# Patient Record
Sex: FEMALE | Race: WHITE | NOT HISPANIC OR LATINO | Employment: FULL TIME | URBAN - METROPOLITAN AREA
[De-identification: names, ages, dates, MRNs, and addresses within clinical notes are randomized per-mention and may not be internally consistent; named-entity substitution may affect disease eponyms.]

---

## 2022-08-24 ENCOUNTER — HOSPITAL ENCOUNTER (EMERGENCY)
Facility: CLINIC | Age: 22
Discharge: HOME | End: 2022-08-24
Attending: PHYSICIAN ASSISTANT | Admitting: PHYSICIAN ASSISTANT

## 2022-08-24 ENCOUNTER — APPOINTMENT (OUTPATIENT)
Dept: ULTRASOUND IMAGING | Facility: CLINIC | Age: 22
End: 2022-08-24
Attending: PHYSICIAN ASSISTANT

## 2022-08-24 VITALS
OXYGEN SATURATION: 98 % | DIASTOLIC BLOOD PRESSURE: 75 MMHG | SYSTOLIC BLOOD PRESSURE: 111 MMHG | TEMPERATURE: 97.3 F | HEART RATE: 78 BPM | RESPIRATION RATE: 18 BRPM

## 2022-08-24 DIAGNOSIS — R10.2 PELVIC PAIN IN FEMALE: ICD-10-CM

## 2022-08-24 LAB
ALBUMIN UR-MCNC: 30 MG/DL
AMORPH CRY #/AREA URNS HPF: ABNORMAL /HPF
ANION GAP SERPL CALCULATED.3IONS-SCNC: 9 MMOL/L (ref 7–15)
APPEARANCE UR: ABNORMAL
BACTERIA #/AREA URNS HPF: ABNORMAL /HPF
BILIRUB UR QL STRIP: NEGATIVE
BUN SERPL-MCNC: 10 MG/DL (ref 6–20)
CALCIUM SERPL-MCNC: 9.3 MG/DL (ref 8.6–10)
CHLORIDE SERPL-SCNC: 104 MMOL/L (ref 98–107)
COLOR UR AUTO: YELLOW
CREAT SERPL-MCNC: 0.57 MG/DL (ref 0.51–0.95)
DEPRECATED HCO3 PLAS-SCNC: 26 MMOL/L (ref 22–29)
ERYTHROCYTE [DISTWIDTH] IN BLOOD BY AUTOMATED COUNT: 11.8 % (ref 10–15)
GFR SERPL CREATININE-BSD FRML MDRD: >90 ML/MIN/1.73M2
GLUCOSE SERPL-MCNC: 90 MG/DL (ref 70–99)
GLUCOSE UR STRIP-MCNC: NEGATIVE MG/DL
HCG UR QL: NEGATIVE
HCT VFR BLD AUTO: 40.4 % (ref 35–47)
HGB BLD-MCNC: 13.1 G/DL (ref 11.7–15.7)
HGB UR QL STRIP: ABNORMAL
HOLD SPECIMEN: NORMAL
KETONES UR STRIP-MCNC: 100 MG/DL
LEUKOCYTE ESTERASE UR QL STRIP: NEGATIVE
MCH RBC QN AUTO: 31.1 PG (ref 26.5–33)
MCHC RBC AUTO-ENTMCNC: 32.4 G/DL (ref 31.5–36.5)
MCV RBC AUTO: 96 FL (ref 78–100)
MUCOUS THREADS #/AREA URNS LPF: PRESENT /LPF
NITRATE UR QL: NEGATIVE
PH UR STRIP: 7.5 [PH] (ref 5–7)
PLATELET # BLD AUTO: 261 10E3/UL (ref 150–450)
POTASSIUM SERPL-SCNC: 3.7 MMOL/L (ref 3.4–5.3)
RBC # BLD AUTO: 4.21 10E6/UL (ref 3.8–5.2)
RBC URINE: >182 /HPF
SODIUM SERPL-SCNC: 139 MMOL/L (ref 136–145)
SP GR UR STRIP: 1.03 (ref 1–1.03)
SQUAMOUS EPITHELIAL: 1 /HPF
UROBILINOGEN UR STRIP-MCNC: NORMAL MG/DL
WBC # BLD AUTO: 11.4 10E3/UL (ref 4–11)
WBC URINE: 19 /HPF

## 2022-08-24 PROCEDURE — 80048 BASIC METABOLIC PNL TOTAL CA: CPT | Performed by: PHYSICIAN ASSISTANT

## 2022-08-24 PROCEDURE — 81003 URINALYSIS AUTO W/O SCOPE: CPT | Performed by: PHYSICIAN ASSISTANT

## 2022-08-24 PROCEDURE — 85027 COMPLETE CBC AUTOMATED: CPT | Performed by: PHYSICIAN ASSISTANT

## 2022-08-24 PROCEDURE — 36415 COLL VENOUS BLD VENIPUNCTURE: CPT | Performed by: PHYSICIAN ASSISTANT

## 2022-08-24 PROCEDURE — 76830 TRANSVAGINAL US NON-OB: CPT

## 2022-08-24 PROCEDURE — 99284 EMERGENCY DEPT VISIT MOD MDM: CPT | Mod: 25

## 2022-08-24 PROCEDURE — 81025 URINE PREGNANCY TEST: CPT | Performed by: PHYSICIAN ASSISTANT

## 2022-08-24 ASSESSMENT — ACTIVITIES OF DAILY LIVING (ADL): ADLS_ACUITY_SCORE: 35

## 2022-08-24 ASSESSMENT — ENCOUNTER SYMPTOMS
DYSURIA: 0
NAUSEA: 1
ABDOMINAL PAIN: 1

## 2022-08-24 NOTE — DISCHARGE INSTRUCTIONS
What do you do next:   Continue your home medications unless we have specifically changed them  Follow up as indicated below (since there is no primary care clinic listed, I will have the [facility] primary care service contact you about establishing follow-up with a clinic)    When do you return: If you have uncontrollable pain, intractable vomiting, profuse vaginal bleeding or you soak a pad edge to edge twice within an hour, or any other symptoms that concern you, please return to the ED for reevaluation.    Thank you for allowing us to care for you today.

## 2022-08-24 NOTE — ED PROVIDER NOTES
History   Chief Complaint:  Abdominal Pain     The history is provided by the patient and a friend (who translated in Ukranian).      Shabnam Clemens is a 22 year old female who presents with lower abdominal pain. The patient reports having a sudden onset of abdominal cramping and severe pain approximately 2 hours ago. She notes that after taking Croatian medication which was a pain reliever and antispasmodic, her pain has almost completely resolved. She has had similar pain once before several years ago while living in [facility]. At that time, she was menstruating and states that the pain was a 10/10 severity, persisting for 2-3 hours with no alleviation. She denies any dysuria but has had some nausea. Notably, she has had some vaginal bleeding but is currently menstruating. She does have history of a hernia repair as a child.     Review of Systems   Gastrointestinal: Positive for abdominal pain and nausea.   Genitourinary: Negative for dysuria.   All other systems reviewed and are negative.    Allergies:  The patient has no known allergies.     Medications:  No history on file. The patient is not currently taking any regularly prescribed medications.    Past Medical History:     No history on file. The patient denies any significant past medical history.    Surgical History:  Hernia repair    Social History:  The patient presents to the ED with her friend.  The patient arrived to the ED in a private vehicle.    Physical Exam     Patient Vitals for the past 24 hrs:   BP Temp Temp src Pulse Resp SpO2   08/24/22 1217 111/75 97.3  F (36.3  C) Temporal 78 18 98 %     Physical Exam  Constitutional: Vital signs reviewed as above.   HENT:    Head: No external signs of trauma noted.   Eyes: Conjunctivae are normal. Pupils are equal, round, and reactive to light.   Cardiovascular:    Normal rate, regular rhythm and normal heart sounds.     Exam reveals no friction rub.     No murmur heard.  Pulmonary/Chest:    Effort  normal and breath sounds normal.    No respiratory distress.    There are no wheezes.    There are no rales.   Gastrointestinal:    Soft.    Bowel sounds normal.    There is no distension.    There is no significant tenderness on my exam.    There is no rebound or guarding.   Musculoskeletal:    Normal range of motion.    Normal Tone  Neurological: Patient is alert and oriented to person, place, and time.   Skin: Skin is warm and dry. Patient is not diaphoretic  Psychiatric: The patient appears calm    Emergency Department Course     Imaging:  US Pelvis Cmplt w Transvag & Doppler LmtPel Duplex Limited   Final Result   IMPRESSION:        1. Normal pelvic ultrasound.      ELSA NOE MD            SYSTEM ID:  [id number]        Report per radiology    Laboratory:  Labs Ordered and Resulted from Time of ED Arrival to Time of ED Departure   URINE MACROSCOPIC WITH REFLEX TO MICRO - Abnormal       Result Value    Color Urine Yellow      Appearance Urine Slightly Cloudy (*)     Glucose Urine Negative      Bilirubin Urine Negative      Ketones Urine 100  (*)     Specific Gravity Urine 1.027      Blood Urine Large (*)     pH Urine 7.5 (*)     Protein Albumin Urine 30  (*)     Urobilinogen Urine Normal      Nitrite Urine Negative      Leukocyte Esterase Urine Negative      Bacteria Urine Few (*)     RBC Urine >182 (*)     WBC Urine 19 (*)     Squamous Epithelials Urine 1      Mucus Urine Present (*)     Amorphous Crystals Urine Few (*)    CBC WITH PLATELETS - Abnormal    WBC Count 11.4 (*)     RBC Count 4.21      Hemoglobin 13.1      Hematocrit 40.4      MCV 96      MCH 31.1      MCHC 32.4      RDW 11.8      Platelet Count 261     BASIC METABOLIC PANEL - Normal    Creatinine 0.57      Sodium 139      Potassium 3.7      Urea Nitrogen 10.0      Chloride 104      Carbon Dioxide (CO2) 26      Anion Gap 9      Glucose 90      GFR Estimate >90      Calcium 9.3     HCG QUALITATIVE URINE - Normal    hCG Urine Qualitative Negative         Procedures      Emergency Department Course:     Reviewed:  I reviewed nursing notes, vitals and past medical history    Assessments/Consults:  ED Course as of 08/24/22 1615   Wed Aug 24, 2022   1600 Rechecked and updated.        Interventions:  Medications - No data to display    Disposition:  The patient was discharged to home.     Impression & Plan     Medical Decision Making:     This 22-year-old female patient presents to the ED due to abdominal discomfort.  Please see the HPI and exam for specifics.  Broad differential was considered including hemorrhagic ovarian cyst, ectopic pregnancy, ovarian torsion, etc.  The above work-up was undertaken.  Laboratory studies, urinalysis, and ultrasound are reassuring.    Based on improvement of symptoms, and reassuring labs/imaging findings, the patient was felt safe to go home.  As there is no primary care clinic listed in [facility], I placed the [facility] primary care referral order and I will encourage further outpatient follow-up.  Anticipatory guidance given prior to discharge.    Diagnosis:    ICD-10-CM    1. Pelvic pain in female  R10.2 Primary Care Referral     Discharge Medications:  New Prescriptions    No medications on file     Scribe Disclosure:  IAnabel, am serving as a scribe at 1:53 PM on 8/24/2022 to document services personally performed by Angel Thompson DO based on my observations and the provider's statements to me.          Angel Thompson DO  08/24/22 8151

## 2022-08-24 NOTE — ED NOTES
Rapid Assessment Note    History: Obtained with ukranian  at bedside  Shabnam Clemens is a 22 year old female with no pertinent past medical history who presents with lower abdominal pain.  Patient reports fairly sudden onset of abdominal cramping and severe pain about an hour prior to coming.  Localizes it to middle suprapubic area of lower abdomen.  Also a little bit in the right lower quadrant.  Had this once several years ago in [facility] given medications but no diagnostics done and symptoms went away.  Felt nauseous no vomiting diarrhea urinary symptoms.  Little bit of vaginal bleeding is currently on her menstrual period.  History of hernia repair in early childhood no other surgeries.  Does not think she is pregnant.  Took some Azeri medications for pain and cramping prior to arrival but not sure what they are and I'm unable to read as written in Azeri. Now pain much better    Exam:   General: Awake, alert, non-toxic.  Head:  Scalp is NC/AT  Eyes:  Conjunctiva normal, PERRL  ENT:  The external nose and ears are normal.   Neck:  Normal range of motion without rigidity.  CV:  Regular rate and rhythm    No pathologic murmur, rubs, or gallops.  Resp:  Breath sounds are clear bilaterally    Non-labored, no retractions or accessory muscle use  Abdomen: Abdomen is soft, no distension, no tenderness, no masses.   MS:  No lower extremity edema or asymmetric calf swelling.  Skin:  Warm and dry, No rash or lesions noted.  Neuro: Alert and oriented.  GCS 15 No gross motor deficits.  No facial asymmetry.  Psych:  Awake. Alert. Normal affect. Appropriate interactions.    Plan of Care:   I evaluated the patient and developed an initial plan of care. I discussed this plan and explained that I, or one of my partners, would be returning to complete the evaluation.  Sudden onset of lower abdominal pain about an hour ago exam benign with no tenderness.  We will start with labs urine ultrasound.  Does not need  any meds at this time.    I, Kory Wood PA-C, am serving as a scribe to document services personally performed by Kory Wood, , based on my observations and the provider's statements to me.    08/24/2022  [employer]    Portions of this medical record were completed by a scribe. UPON MY REVIEW AND AUTHENTICATION BY ELECTRONIC SIGNATURE, this confirms (a) I performed the applicable clinical services, and (b) the record is accurate.        Kory Wood PA-C  08/24/22 1854

## 2022-09-20 NOTE — ED TRIAGE NOTES
Pt here with c/o lower abd pain about an hour PTA. Pain is non-radiating. Denies dysuria, fever, n/v. Currently on menstrual period. Took a medication she had left over from [facility] with some relief, but unsure what it is. ABC intact.        Signed order

## 2022-09-26 ENCOUNTER — HOSPITAL ENCOUNTER (EMERGENCY)
Facility: CLINIC | Age: 22
Discharge: LEFT BEFORE DISCHARGE | End: 2022-09-26
Payer: COMMERCIAL

## 2022-09-26 VITALS
HEART RATE: 79 BPM | DIASTOLIC BLOOD PRESSURE: 79 MMHG | OXYGEN SATURATION: 100 % | WEIGHT: 185.19 LBS | RESPIRATION RATE: 16 BRPM | TEMPERATURE: 98.6 F | SYSTOLIC BLOOD PRESSURE: 119 MMHG

## 2022-09-26 ASSESSMENT — ACTIVITIES OF DAILY LIVING (ADL): ADLS_ACUITY_SCORE: 33

## 2022-09-26 NOTE — ED TRIAGE NOTES
Pt arrives for cramping and pain related to her first day of menstruation. Was seen one month ago for similar symptoms. Denies any excessive bleeding.      Triage Assessment     Row Name 09/26/22 8004       Triage Assessment (Adult)    Airway WDL WDL       Respiratory WDL    Respiratory WDL WDL       Skin Circulation/Temperature WDL    Skin Circulation/Temperature WDL WDL       Cardiac WDL    Cardiac WDL WDL       Peripheral/Neurovascular WDL    Peripheral Neurovascular WDL WDL       Cognitive/Neuro/Behavioral WDL    Cognitive/Neuro/Behavioral WDL WDL

## 2022-11-08 DIAGNOSIS — Z02.89 REFUGEE HEALTH EXAMINATION: Primary | ICD-10-CM
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
## 2022-11-08 NOTE — PROGRESS NOTES
"Initial Refugee Screening Exam    PC staff should enter immunizations into the chart {IMMUNIZATION:974225}     used this visit: {:331388}    HEALTH HISTORY    Concerns today: {NONE:526644}    Country of Origin:  {country2:412741}  Year left country of Origin: ***  Other countries lived in and dates: {country2:375015}  Date of Arrival in [place]: ***  Is our listed age correct? {BlankBase Single Select.:652588::\"No ***\",\"Yes\"}  Native Language: {LANGUAGE:476968}  Family in [place]: {BlankBase Single Select.:151517::\"Yes ***\",\"No\"}    Pre-Departure Medical Screening Examination Reviewed:{ :528939::\"No\"}  Class A conditions: { :665562::\"No\"}  Class B conditions: { :916882::\"No\"}  Presumptive treatment for intestinal parasites?: { :446014::\"No\"}  History of BCG vaccination: {Yes/No:1703163}  Chronic or serious illness: { :906069::\"No\"}  Hospitalizations: { :826252::\"No\"}  Trauma: { :234424::\"No\"}    *** Family history, medication list, problem list and allergies were reviewed and updated as needed in Epic.    ROS:    {ROS complete zebra:518850::\"C: NEGATIVE for fever, chills, change in weight\",\"I: NEGATIVE for worrisome rashes, moles or lesions, spots without sensations (e.g. leprosy)\",\"E: NEGATIVE for vision changes or irritation or red eyes\",\"E/M: NEGATIVE for ear, mouth and throat problems\",\"R: NEGATIVE for significant cough or SOB\",\"CV: NEGATIVE for chest pain, palpitations or peripheral edema\",\"GI: NEGATIVE for nausea, abdominal pain, heartburn, or change in bowel habits\",\": NEGATIVE for frequency, dysuria, or hematuria\",\"M: NEGATIVE for significant arthralgias or myalgia\",\"N: NEGATIVE for weakness, dizziness or paresthesias, headaches\",\"E: NEGATIVE for temperature intolerance, skin/hair changes\",\"H: NEGATIVE for bleeding problems\",\"P: NEGATIVE for nightmares, no sleep problems, not easily saddened or angered\"}    Mental Health:    1. In the past month, have you felt too sad? { :813698::\"No\"}  2. In " "the past month, have you been worrying or thinking too much? { :624688::\"No\"}  3. In the past month, have thoughts about the past that kept you from doing things or spending time  with others? { :244721::\"No\"}  4. In the past month, did you have sleep problems? { :468466::\"No\"}  5. In the past month, did you have memory problems? { :933682::\"No\"}    If any of the above answers were yes, then ask:  6. Did any of the above stop you from doing things you need to do every day? { :969504::\"No\"}    Offered behavioral health referral: { :156323::\"No\"}    Mental Health Questions for children 6-18 (delete if not used)    Do you have trouble sleeping? { :994473::\"No\"}  Do you have changes in your appetite? { :162112::\"No\"}  Do you get jumpy easily when you hear loud noises (i.e. door closes, a book drops on the floor)? { :543214::\"No\"}  Do you ever have bad dreams or nightmares? Do they remind you of things that really happened to you in the past? { :346883::\"No\"}  Do you  often think about things that happened to you in the past? { :059005::\"No\"}  Do you feel too sad? If so, when? { :337876::\"No\"}  Do you ever feel like you do not want to be alive? If yes, do you ever think about or have you ever harmed yourself? { :391293::\"No\"}  Do you get angry easily? { :494514::\"No\"}      EXAMINATION:  [facility] 09/26/2022 (Exact Date)   {EXAM - NORMAL- condensed - partially selected:044922::\"GENERAL: healthy, alert, well nourished, well hydrated, no distress\",\"HENT: ear canals- normal; TMs- normal; Nose- normal; Mouth- no ulcers, no lesions\",\"NECK: no tenderness, no adenopathy, no asymmetry, no masses, no stiffness; thyroid- normal to palpation\",\"RESP: lungs clear to auscultation - no rales, no rhonchi, no wheezes\",\"CV: regular rates and rhythm, normal S1 S2, no S3 or S4 and no murmur, no click or rub -\",\"ABDOMEN: soft, no tenderness, no  hepatosplenomegaly, no masses, normal bowel sounds\"}    ASSESSMENT:    New Arrival Health Screening " "    PLAN:    1) Labs:  (Please obtain O&P if patient NOT pretreated for intestinal parasites overseas)    {BlankBase Multiple Select:337377::\"CMP\",\"Lipid if age >18\",\"Lead if age <17\",\"CBC with diff\",\"B12 if from [facility] or clinical suspicion\",\"TB Quant if age 2 or older\",\"RPR\",\"Strongyloides Ab\",\"Schistosoma Ab\",\"HIV\",\"Heb B Core Ab\",\"Hep B Surface Ab\",\"Hep B Surface Ag\",\"Hep C Ab\",\"Hep A Ab\",\"O & P, direct smears x 2 concentration and ID\",\"Varicella titer\",\"Malaria if suggested by history or clinical suspicion\",\"Urine for GC/Chlamydia if pt of sexually active age\",\"Urine Pregnancy Test if female of childbearing age\"}       2) TB:    PPD if 6 months to under 2 years old, TB Quant if over 2 years old      3) Immunizations to be applied at second visit.      {[facility] 2021 Documentation (Optional):531214}  {2021 E&M time (Optional):110751}  {Provider  Link to [facility] Help Grid :185147}        RTC in 2-4 weeks for discussion of results, treatment (if necessary) and  devlopment of an ongoing plan for care    Karina Potter MD        "

## 2022-11-11 ENCOUNTER — OFFICE VISIT (OUTPATIENT)
Dept: FAMILY MEDICINE | Facility: CLINIC | Age: 22
End: 2022-11-11
Payer: COMMERCIAL

## 2022-11-11 VITALS
DIASTOLIC BLOOD PRESSURE: 73 MMHG | SYSTOLIC BLOOD PRESSURE: 110 MMHG | HEART RATE: 95 BPM | WEIGHT: 190.6 LBS | BODY MASS INDEX: 32.54 KG/M2 | TEMPERATURE: 97.6 F | RESPIRATION RATE: 16 BRPM | OXYGEN SATURATION: 99 % | HEIGHT: 64 IN

## 2022-11-11 DIAGNOSIS — Z02.89 REFUGEE HEALTH EXAMINATION: ICD-10-CM

## 2022-11-11 DIAGNOSIS — Z23 NEED FOR PROPHYLACTIC VACCINATION AND INOCULATION AGAINST INFLUENZA: Primary | ICD-10-CM

## 2022-11-11 LAB
ALBUMIN SERPL BCG-MCNC: 4.6 G/DL (ref 3.5–5.2)
ALP SERPL-CCNC: 73 U/L (ref 35–104)
ALT SERPL W P-5'-P-CCNC: 19 U/L (ref 10–35)
ANION GAP SERPL CALCULATED.3IONS-SCNC: 17 MMOL/L (ref 7–15)
AST SERPL W P-5'-P-CCNC: 29 U/L (ref 10–35)
BASOPHILS # BLD AUTO: 0 10E3/UL (ref 0–0.2)
BASOPHILS NFR BLD AUTO: 0 %
BILIRUB SERPL-MCNC: 0.5 MG/DL
BUN SERPL-MCNC: 8.7 MG/DL (ref 6–20)
CALCIUM SERPL-MCNC: 9.5 MG/DL (ref 8.6–10)
CHLORIDE SERPL-SCNC: 102 MMOL/L (ref 98–107)
CHOLEST SERPL-MCNC: 203 MG/DL
CREAT SERPL-MCNC: 0.55 MG/DL (ref 0.51–0.95)
DEPRECATED HCO3 PLAS-SCNC: 21 MMOL/L (ref 22–29)
EOSINOPHIL # BLD AUTO: 0.1 10E3/UL (ref 0–0.7)
EOSINOPHIL NFR BLD AUTO: 2 %
ERYTHROCYTE [DISTWIDTH] IN BLOOD BY AUTOMATED COUNT: 11.7 % (ref 10–15)
GFR SERPL CREATININE-BSD FRML MDRD: >90 ML/MIN/1.73M2
GLUCOSE SERPL-MCNC: 103 MG/DL (ref 70–99)
HBA1C MFR BLD: 5.1 % (ref 0–5.6)
HCG UR QL: NEGATIVE
HCT VFR BLD AUTO: 42.3 % (ref 35–47)
HDLC SERPL-MCNC: 56 MG/DL
HGB BLD-MCNC: 14.2 G/DL (ref 11.7–15.7)
IMM GRANULOCYTES # BLD: 0 10E3/UL
IMM GRANULOCYTES NFR BLD: 0 %
LDLC SERPL CALC-MCNC: 124 MG/DL
LYMPHOCYTES # BLD AUTO: 1.8 10E3/UL (ref 0.8–5.3)
LYMPHOCYTES NFR BLD AUTO: 29 %
MCH RBC QN AUTO: 30.7 PG (ref 26.5–33)
MCHC RBC AUTO-ENTMCNC: 33.6 G/DL (ref 31.5–36.5)
MCV RBC AUTO: 91 FL (ref 78–100)
MONOCYTES # BLD AUTO: 0.4 10E3/UL (ref 0–1.3)
MONOCYTES NFR BLD AUTO: 7 %
NEUTROPHILS # BLD AUTO: 3.7 10E3/UL (ref 1.6–8.3)
NEUTROPHILS NFR BLD AUTO: 61 %
NONHDLC SERPL-MCNC: 147 MG/DL
PLATELET # BLD AUTO: 276 10E3/UL (ref 150–450)
POTASSIUM SERPL-SCNC: 3.8 MMOL/L (ref 3.4–5.3)
PROT SERPL-MCNC: 7.7 G/DL (ref 6.4–8.3)
RBC # BLD AUTO: 4.63 10E6/UL (ref 3.8–5.2)
SODIUM SERPL-SCNC: 140 MMOL/L (ref 136–145)
TRIGL SERPL-MCNC: 116 MG/DL
WBC # BLD AUTO: 6 10E3/UL (ref 4–11)

## 2022-11-11 PROCEDURE — 86803 HEPATITIS C AB TEST: CPT | Performed by: STUDENT IN AN ORGANIZED HEALTH CARE EDUCATION/TRAINING PROGRAM

## 2022-11-11 PROCEDURE — 87591 N.GONORRHOEAE DNA AMP PROB: CPT | Performed by: STUDENT IN AN ORGANIZED HEALTH CARE EDUCATION/TRAINING PROGRAM

## 2022-11-11 PROCEDURE — 83036 HEMOGLOBIN GLYCOSYLATED A1C: CPT | Performed by: STUDENT IN AN ORGANIZED HEALTH CARE EDUCATION/TRAINING PROGRAM

## 2022-11-11 PROCEDURE — 87340 HEPATITIS B SURFACE AG IA: CPT | Performed by: STUDENT IN AN ORGANIZED HEALTH CARE EDUCATION/TRAINING PROGRAM

## 2022-11-11 PROCEDURE — 86704 HEP B CORE ANTIBODY TOTAL: CPT | Performed by: STUDENT IN AN ORGANIZED HEALTH CARE EDUCATION/TRAINING PROGRAM

## 2022-11-11 PROCEDURE — 90471 IMMUNIZATION ADMIN: CPT | Performed by: STUDENT IN AN ORGANIZED HEALTH CARE EDUCATION/TRAINING PROGRAM

## 2022-11-11 PROCEDURE — 90686 IIV4 VACC NO PRSV 0.5 ML IM: CPT | Performed by: STUDENT IN AN ORGANIZED HEALTH CARE EDUCATION/TRAINING PROGRAM

## 2022-11-11 PROCEDURE — 86481 TB AG RESPONSE T-CELL SUSP: CPT | Performed by: STUDENT IN AN ORGANIZED HEALTH CARE EDUCATION/TRAINING PROGRAM

## 2022-11-11 PROCEDURE — 80053 COMPREHEN METABOLIC PANEL: CPT | Performed by: STUDENT IN AN ORGANIZED HEALTH CARE EDUCATION/TRAINING PROGRAM

## 2022-11-11 PROCEDURE — 36415 COLL VENOUS BLD VENIPUNCTURE: CPT | Performed by: STUDENT IN AN ORGANIZED HEALTH CARE EDUCATION/TRAINING PROGRAM

## 2022-11-11 PROCEDURE — 86787 VARICELLA-ZOSTER ANTIBODY: CPT | Performed by: STUDENT IN AN ORGANIZED HEALTH CARE EDUCATION/TRAINING PROGRAM

## 2022-11-11 PROCEDURE — 86706 HEP B SURFACE ANTIBODY: CPT | Performed by: STUDENT IN AN ORGANIZED HEALTH CARE EDUCATION/TRAINING PROGRAM

## 2022-11-11 PROCEDURE — 80061 LIPID PANEL: CPT | Performed by: STUDENT IN AN ORGANIZED HEALTH CARE EDUCATION/TRAINING PROGRAM

## 2022-11-11 PROCEDURE — 86708 HEPATITIS A ANTIBODY: CPT | Performed by: STUDENT IN AN ORGANIZED HEALTH CARE EDUCATION/TRAINING PROGRAM

## 2022-11-11 PROCEDURE — 87389 HIV-1 AG W/HIV-1&-2 AB AG IA: CPT | Performed by: STUDENT IN AN ORGANIZED HEALTH CARE EDUCATION/TRAINING PROGRAM

## 2022-11-11 PROCEDURE — 81025 URINE PREGNANCY TEST: CPT | Performed by: STUDENT IN AN ORGANIZED HEALTH CARE EDUCATION/TRAINING PROGRAM

## 2022-11-11 PROCEDURE — 99385 PREV VISIT NEW AGE 18-39: CPT | Mod: 25 | Performed by: STUDENT IN AN ORGANIZED HEALTH CARE EDUCATION/TRAINING PROGRAM

## 2022-11-11 PROCEDURE — 86780 TREPONEMA PALLIDUM: CPT | Performed by: STUDENT IN AN ORGANIZED HEALTH CARE EDUCATION/TRAINING PROGRAM

## 2022-11-11 PROCEDURE — 87491 CHLMYD TRACH DNA AMP PROBE: CPT | Performed by: STUDENT IN AN ORGANIZED HEALTH CARE EDUCATION/TRAINING PROGRAM

## 2022-11-11 PROCEDURE — 85025 COMPLETE CBC W/AUTO DIFF WBC: CPT | Performed by: STUDENT IN AN ORGANIZED HEALTH CARE EDUCATION/TRAINING PROGRAM

## 2022-11-11 NOTE — PROGRESS NOTES
Preceptor Attestation:  I discussed the patient with the resident and evaluated the patient in person. I have verified the content of the note, which accurately reflects my assessment of the patient and the plan of care.  Supervising Physician:  Nusrat Chino MD.

## 2022-11-11 NOTE — NURSING NOTE
Due to patient being non-English speaking/uses sign language, an  was used for this visit. Only for face-to-face interpretation by an external agency, date and length of interpretation can be found on the scanned worksheet.       name: HALLIE   Language: Rwandan   Agency:  [facility]   Type of interpretation:  TELEPHONE      BENNIE Dubois  2:13 PM  11/11/2022

## 2022-11-12 LAB
C TRACH DNA SPEC QL NAA+PROBE: NEGATIVE
N GONORRHOEA DNA SPEC QL NAA+PROBE: NEGATIVE
T PALLIDUM AB SER QL: NONREACTIVE

## 2022-11-14 LAB
GAMMA INTERFERON BACKGROUND BLD IA-ACNC: 0.04 IU/ML
HAV IGG SER QL IA: NONREACTIVE
HBV CORE AB SERPL QL IA: NONREACTIVE
HBV SURFACE AB SERPL IA-ACNC: 0 M[IU]/ML
HBV SURFACE AB SERPL IA-ACNC: NONREACTIVE M[IU]/ML
HBV SURFACE AG SERPL QL IA: NONREACTIVE
HCV AB SERPL QL IA: NONREACTIVE
M TB IFN-G BLD-IMP: NEGATIVE
M TB IFN-G CD4+ BCKGRND COR BLD-ACNC: 9.07 IU/ML
MITOGEN IGNF BCKGRD COR BLD-ACNC: 0.07 IU/ML
MITOGEN IGNF BCKGRD COR BLD-ACNC: 0.08 IU/ML
QUANTIFERON MITOGEN: 9.11 IU/ML
QUANTIFERON NIL TUBE: 0.04 IU/ML
QUANTIFERON TB1 TUBE: 0.12 IU/ML
QUANTIFERON TB2 TUBE: 0.11
VZV IGG SER QL IA: 460.8 INDEX
VZV IGG SER QL IA: POSITIVE

## 2022-11-15 LAB — HIV 1+2 AB+HIV1 P24 AG SERPL QL IA: NONREACTIVE

## 2022-11-25 ENCOUNTER — OFFICE VISIT (OUTPATIENT)
Dept: FAMILY MEDICINE | Facility: CLINIC | Age: 22
End: 2022-11-25
Payer: COMMERCIAL

## 2022-11-25 VITALS
DIASTOLIC BLOOD PRESSURE: 78 MMHG | TEMPERATURE: 98.6 F | RESPIRATION RATE: 16 BRPM | OXYGEN SATURATION: 98 % | WEIGHT: 196.8 LBS | BODY MASS INDEX: 33.6 KG/M2 | SYSTOLIC BLOOD PRESSURE: 116 MMHG | HEART RATE: 76 BPM | HEIGHT: 64 IN

## 2022-11-25 DIAGNOSIS — N94.6 MENSTRUAL CRAMP: ICD-10-CM

## 2022-11-25 DIAGNOSIS — E66.09 CLASS 1 OBESITY DUE TO EXCESS CALORIES WITHOUT SERIOUS COMORBIDITY WITH BODY MASS INDEX (BMI) OF 34.0 TO 34.9 IN ADULT: ICD-10-CM

## 2022-11-25 DIAGNOSIS — E66.811 CLASS 1 OBESITY DUE TO EXCESS CALORIES WITHOUT SERIOUS COMORBIDITY WITH BODY MASS INDEX (BMI) OF 34.0 TO 34.9 IN ADULT: ICD-10-CM

## 2022-11-25 DIAGNOSIS — R30.0 DYSURIA: Primary | ICD-10-CM

## 2022-11-25 PROBLEM — E78.00 HYPERCHOLESTEREMIA: Status: ACTIVE | Noted: 2022-11-25

## 2022-11-25 LAB
ALBUMIN UR-MCNC: NEGATIVE MG/DL
APPEARANCE UR: CLEAR
BACTERIA #/AREA URNS HPF: ABNORMAL /HPF
BILIRUB UR QL STRIP: NEGATIVE
COLOR UR AUTO: YELLOW
GLUCOSE UR STRIP-MCNC: NEGATIVE MG/DL
HGB UR QL STRIP: ABNORMAL
KETONES UR STRIP-MCNC: NEGATIVE MG/DL
LEUKOCYTE ESTERASE UR QL STRIP: NEGATIVE
NITRATE UR QL: NEGATIVE
PH UR STRIP: 7.5 [PH] (ref 5–8)
RBC #/AREA URNS AUTO: ABNORMAL /HPF
SP GR UR STRIP: 1.02 (ref 1–1.03)
SQUAMOUS #/AREA URNS AUTO: ABNORMAL /LPF
UROBILINOGEN UR STRIP-ACNC: 0.2 E.U./DL
WBC #/AREA URNS AUTO: ABNORMAL /HPF

## 2022-11-25 PROCEDURE — 90746 HEPB VACCINE 3 DOSE ADULT IM: CPT | Performed by: STUDENT IN AN ORGANIZED HEALTH CARE EDUCATION/TRAINING PROGRAM

## 2022-11-25 PROCEDURE — 90715 TDAP VACCINE 7 YRS/> IM: CPT | Performed by: STUDENT IN AN ORGANIZED HEALTH CARE EDUCATION/TRAINING PROGRAM

## 2022-11-25 PROCEDURE — 90471 IMMUNIZATION ADMIN: CPT | Performed by: STUDENT IN AN ORGANIZED HEALTH CARE EDUCATION/TRAINING PROGRAM

## 2022-11-25 PROCEDURE — 90472 IMMUNIZATION ADMIN EACH ADD: CPT | Performed by: STUDENT IN AN ORGANIZED HEALTH CARE EDUCATION/TRAINING PROGRAM

## 2022-11-25 PROCEDURE — 81001 URINALYSIS AUTO W/SCOPE: CPT | Performed by: STUDENT IN AN ORGANIZED HEALTH CARE EDUCATION/TRAINING PROGRAM

## 2022-11-25 PROCEDURE — 99213 OFFICE O/P EST LOW 20 MIN: CPT | Mod: 25 | Performed by: STUDENT IN AN ORGANIZED HEALTH CARE EDUCATION/TRAINING PROGRAM

## 2022-11-25 PROCEDURE — 87086 URINE CULTURE/COLONY COUNT: CPT | Performed by: STUDENT IN AN ORGANIZED HEALTH CARE EDUCATION/TRAINING PROGRAM

## 2022-11-25 RX ORDER — BUPROPION HYDROCHLORIDE 150 MG/1
150 TABLET ORAL EVERY MORNING
Qty: 60 TABLET | Refills: 0 | Status: SHIPPED | OUTPATIENT
Start: 2022-11-25

## 2022-11-25 NOTE — Clinical Note
"This note kept showing up as incomplete.  I realized it was on \"pend\" and not \"sign\" Should be good to go now. Thank you!"

## 2022-11-25 NOTE — PROGRESS NOTES
REFUGEE SCREENING: SECOND VISIT    Subjective: ***    Labs from Initial Refugee Screening Visit were reviewed       Office Visit on 11/11/2022   Component Date Value Ref Range Status     Sodium 11/11/2022 140  136 - 145 mmol/L Final     Potassium 11/11/2022 3.8  3.4 - 5.3 mmol/L Final     Chloride 11/11/2022 102  98 - 107 mmol/L Final     Carbon Dioxide (CO2) 11/11/2022 21 (L)  22 - 29 mmol/L Final     Anion Gap 11/11/2022 17 (H)  7 - 15 mmol/L Final     Urea Nitrogen 11/11/2022 8.7  6.0 - 20.0 mg/dL Final     Creatinine 11/11/2022 0.55  0.51 - 0.95 mg/dL Final     Calcium 11/11/2022 9.5  8.6 - 10.0 mg/dL Final     Glucose 11/11/2022 103 (H)  70 - 99 mg/dL Final     Alkaline Phosphatase 11/11/2022 73  35 - 104 U/L Final     AST 11/11/2022 29  10 - 35 U/L Final     ALT 11/11/2022 19  10 - 35 U/L Final     Protein Total 11/11/2022 7.7  6.4 - 8.3 g/dL Final     Albumin 11/11/2022 4.6  3.5 - 5.2 g/dL Final     Bilirubin Total 11/11/2022 0.5  <=1.2 mg/dL Final     GFR Estimate 11/11/2022 >90  >60 mL/min/1.73m2 Final    Effective December 21, 2021 eGFRcr in adults is calculated using the 2021 CKD-EPI creatinine equation which includes age and gender (Daniela et al., NEJ, DOI: 10.1056/PAEAdi4231370)     Hemoglobin A1C 11/11/2022 5.1  0.0 - 5.6 % Final    Normal <5.7%   Prediabetes 5.7-6.4%    Diabetes 6.5% or higher     Note: Adopted from ADA consensus guidelines.     VZV Kasie IgG Instrument Value 11/11/2022 460.8  <135.0 Index Final     Varicella Zoster Antibody IgG 11/11/2022 Positive   Final    Suggests previous exposure or immunization and probable immunity.     Hepatitis B Core Antibody Total 11/11/2022 Nonreactive  Nonreactive Final     Hepatitis B Surface Antigen 11/11/2022 Nonreactive  Nonreactive Final     Hepatitis B Surface Antibody Instr* 11/11/2022 0.00  <8.00 m[IU]/mL Final     Hepatitis B Surface Antibody 11/11/2022 Nonreactive   Final    No antibody detected when the value is less than 8.00 mIU/mL.      Hepatitis C Antibody 11/11/2022 Nonreactive  Nonreactive Final     Treponema Antibody Total 11/11/2022 Nonreactive  Nonreactive Final     hCG Urine Qualitative 11/11/2022 Negative  Negative Final    This test is for screening purposes.  Results should be interpreted along with the clinical picture.  Confirmation testing is available if warranted by ordering [id number], HCG Quantitative Pregnancy.     HIV Antigen Antibody Combo 11/11/2022 Nonreactive  Nonreactive Final    HIV-1 p24 Ag & HIV-1/HIV-2 Ab Not Detected     Cholesterol 11/11/2022 203 (H)  <200 mg/dL Final     Triglycerides 11/11/2022 116  <150 mg/dL Final     Direct Measure HDL 11/11/2022 56  >=50 mg/dL Final     LDL Cholesterol Calculated 11/11/2022 124 (H)  <=100 mg/dL Final     Non HDL Cholesterol 11/11/2022 147 (H)  <130 mg/dL Final     Hepatitis A Antibody IgG 11/11/2022 Nonreactive  Nonreactive Final     WBC Count 11/11/2022 6.0  4.0 - 11.0 10e3/uL Final     RBC Count 11/11/2022 4.63  3.80 - 5.20 10e6/uL Final     Hemoglobin 11/11/2022 14.2  11.7 - 15.7 g/dL Final     Hematocrit 11/11/2022 42.3  35.0 - 47.0 % Final     MCV 11/11/2022 91  78 - 100 fL Final     MCH 11/11/2022 30.7  26.5 - 33.0 pg Final     MCHC 11/11/2022 33.6  31.5 - 36.5 g/dL Final     RDW 11/11/2022 11.7  10.0 - 15.0 % Final     Platelet Count 11/11/2022 276  150 - 450 10e3/uL Final     % Neutrophils 11/11/2022 61  % Final     % Lymphocytes 11/11/2022 29  % Final     % Monocytes 11/11/2022 7  % Final     % Eosinophils 11/11/2022 2  % Final     % Basophils 11/11/2022 0  % Final     % Immature Granulocytes 11/11/2022 0  % Final     Absolute Neutrophils 11/11/2022 3.7  1.6 - 8.3 10e3/uL Final     Absolute Lymphocytes 11/11/2022 1.8  0.8 - 5.3 10e3/uL Final     Absolute Monocytes 11/11/2022 0.4  0.0 - 1.3 10e3/uL Final     Absolute Eosinophils 11/11/2022 0.1  0.0 - 0.7 10e3/uL Final     Absolute Basophils 11/11/2022 0.0  0.0 - 0.2 10e3/uL Final     Absolute Immature Granulocytes 11/11/2022  0.0  <=0.4 10e3/uL Final     Quantiferon Nil Tube 11/11/2022 0.04  IU/mL Final     Quantiferon TB1 Tube 11/11/2022 0.12  IU/mL Final     Quantiferon TB2 Tube 11/11/2022 0.11   Final     Quantiferon Mitogen 11/11/2022 9.11  IU/mL Final     Quantiferon-TB Gold Plus 11/11/2022 Negative  Negative Final    No interferon gamma response to M.tuberculosis antigens was detected. Infection with M.tuberculosis is unlikely, however a single negative result does not exclude infection. In patients at high risk for infection, a second test should be considered in accordance with the 2017 ATS/IDSA/CDC Clinical Pract  ice Guidelines for Diagnosis of Tuberculosis in Adults and Children      TB1 Ag minus Nil Value 11/11/2022 0.08  IU/mL Final     TB2 Ag minus Nil Value 11/11/2022 0.07  IU/mL Final     Mitogen minus Nil Result 11/11/2022 9.07  IU/mL Final     Nil Result 11/11/2022 0.04  IU/mL Final   Orders Only on 11/08/2022   Component Date Value Ref Range Status     Neisseria gonorrhoeae 11/11/2022 Negative  Negative Final    Negative for N. gonorrhoeae rRNA by transcription mediated amplification. A negative result by transcription mediated amplification does not preclude the presence of C. trachomatis infection because results are dependent on proper and adequate collection, absence of inhibitors and sufficient rRNA to be detected.     Chlamydia trachomatis 11/11/2022 Negative  Negative Final    A negative result by transcription mediated amplification does not preclude the presence of C. trachomatis infection because results are dependent on proper and adequate collection, absence of inhibitors and sufficient rRNA to be detected.        ROS:  General: No fevers, sleeping well at night  Head: No headache  Neck: No swallowing problems   CV: No chest pain or palpitations  Resp: No shortness of breath.  No cough.  GI: No constipation, or diarrhea, no nausea or vomiting  : No urinary c/o    Objective:  /78 (BP Location: Left  "arm, Patient Position: Sitting, Cuff Size: Adult Regular)   Pulse 76   Temp 98.6  F (37  C) (Oral)   Resp 16   Ht 1.619 m (5' 3.75\")   Wt 89.3 kg (196 lb 12.8 oz)   LMP 09/26/2022 (Exact Date)   SpO2 98%   BMI 34.05 kg/m    Gen:  Well nourished and in NAD  HEENT: nasopharynx pink and moist; oropharynx pink and moist  Neck: supple without lymphadenopathy  CV:  RRR  - no murmurs, rubs, or gallups,   Pulm:  CTAB, no wheezes/rales/rhonchi, good air entry   ABD: soft, nontender  Extrem: no cyanosis, edema or clubbing;   Psych: Euthymic     Assessment/Plan:  1. Cervical cancer screening  ***      1)Abnormal lab results:  *** None    2) Abnormal parasite results and treatment plant:  *** None    3) TB:   (If class A or B TB overseas will need referral to [facility] regardless of results in clinic. Please obtain CXR in clinic.)  {TB Choices:242443}    4)Immunizations:  (Remember: patients who received TD only overseas will need TDAP. This does not always show in [facility] Health Maintenance.)  {IMMUNIZATIONS SUGGESTED:589428}    5)Referrals:  {Yes no (what when):797266}     6)Follow up plan:   Return to clinic for {[employer].:731620::\"annual physical\",\"women's health exam\",\"well child check\",\"acute care concern ***\",\"chronic medical condition ***\"} in ***    We discussed having a visit with a dentist to establish regular dental care: {088817}  We discussed yearly visits with a primary care physician for preventative health care: {936109}      {[facility] 2021 Documentation (Optional):234482}  {2021 E&M time (Optional):302286}  {Provider  Link to [facility] Help Grid :101326}       Karina Potter MD                "

## 2022-11-27 LAB — BACTERIA UR CULT: NO GROWTH

## 2022-12-03 PROBLEM — E78.00 HYPERCHOLESTEREMIA: Status: RESOLVED | Noted: 2022-11-25 | Resolved: 2022-12-03

## 2022-12-03 NOTE — PROGRESS NOTES
REFUGEE SCREENING: SECOND VISIT    Subjective:  Chief Complaint   Patient presents with     Follow Up     Refugee     Other     Having abdominal with menses     Weight Problem     Like to discuss weight loss     Patient Request     Like to get med to help relief pain           Labs from Initial Refugee Screening Visit were reviewed       Office Visit on 11/11/2022   Component Date Value Ref Range Status     Sodium 11/11/2022 140  136 - 145 mmol/L Final     Potassium 11/11/2022 3.8  3.4 - 5.3 mmol/L Final     Chloride 11/11/2022 102  98 - 107 mmol/L Final     Carbon Dioxide (CO2) 11/11/2022 21 (L)  22 - 29 mmol/L Final     Anion Gap 11/11/2022 17 (H)  7 - 15 mmol/L Final     Urea Nitrogen 11/11/2022 8.7  6.0 - 20.0 mg/dL Final     Creatinine 11/11/2022 0.55  0.51 - 0.95 mg/dL Final     Calcium 11/11/2022 9.5  8.6 - 10.0 mg/dL Final     Glucose 11/11/2022 103 (H)  70 - 99 mg/dL Final     Alkaline Phosphatase 11/11/2022 73  35 - 104 U/L Final     AST 11/11/2022 29  10 - 35 U/L Final     ALT 11/11/2022 19  10 - 35 U/L Final     Protein Total 11/11/2022 7.7  6.4 - 8.3 g/dL Final     Albumin 11/11/2022 4.6  3.5 - 5.2 g/dL Final     Bilirubin Total 11/11/2022 0.5  <=1.2 mg/dL Final     GFR Estimate 11/11/2022 >90  >60 mL/min/1.73m2 Final    Effective December 21, 2021 eGFRcr in adults is calculated using the 2021 CKD-EPI creatinine equation which includes age and gender (Daniela et al., NEJ, DOI: 10.1056/HVZVmf5173625)     Hemoglobin A1C 11/11/2022 5.1  0.0 - 5.6 % Final    Normal <5.7%   Prediabetes 5.7-6.4%    Diabetes 6.5% or higher     Note: Adopted from ADA consensus guidelines.     VZV Kasie IgG Instrument Value 11/11/2022 460.8  <135.0 Index Final     Varicella Zoster Antibody IgG 11/11/2022 Positive   Final    Suggests previous exposure or immunization and probable immunity.     Hepatitis B Core Antibody Total 11/11/2022 Nonreactive  Nonreactive Final     Hepatitis B Surface Antigen 11/11/2022 Nonreactive  Nonreactive  Final     Hepatitis B Surface Antibody Instr* 11/11/2022 0.00  <8.00 m[IU]/mL Final     Hepatitis B Surface Antibody 11/11/2022 Nonreactive   Final    No antibody detected when the value is less than 8.00 mIU/mL.     Hepatitis C Antibody 11/11/2022 Nonreactive  Nonreactive Final     Treponema Antibody Total 11/11/2022 Nonreactive  Nonreactive Final     hCG Urine Qualitative 11/11/2022 Negative  Negative Final    This test is for screening purposes.  Results should be interpreted along with the clinical picture.  Confirmation testing is available if warranted by ordering [id number], HCG Quantitative Pregnancy.     HIV Antigen Antibody Combo 11/11/2022 Nonreactive  Nonreactive Final    HIV-1 p24 Ag & HIV-1/HIV-2 Ab Not Detected     Cholesterol 11/11/2022 203 (H)  <200 mg/dL Final     Triglycerides 11/11/2022 116  <150 mg/dL Final     Direct Measure HDL 11/11/2022 56  >=50 mg/dL Final     LDL Cholesterol Calculated 11/11/2022 124 (H)  <=100 mg/dL Final     Non HDL Cholesterol 11/11/2022 147 (H)  <130 mg/dL Final     Hepatitis A Antibody IgG 11/11/2022 Nonreactive  Nonreactive Final     WBC Count 11/11/2022 6.0  4.0 - 11.0 10e3/uL Final     RBC Count 11/11/2022 4.63  3.80 - 5.20 10e6/uL Final     Hemoglobin 11/11/2022 14.2  11.7 - 15.7 g/dL Final     Hematocrit 11/11/2022 42.3  35.0 - 47.0 % Final     MCV 11/11/2022 91  78 - 100 fL Final     MCH 11/11/2022 30.7  26.5 - 33.0 pg Final     MCHC 11/11/2022 33.6  31.5 - 36.5 g/dL Final     RDW 11/11/2022 11.7  10.0 - 15.0 % Final     Platelet Count 11/11/2022 276  150 - 450 10e3/uL Final     % Neutrophils 11/11/2022 61  % Final     % Lymphocytes 11/11/2022 29  % Final     % Monocytes 11/11/2022 7  % Final     % Eosinophils 11/11/2022 2  % Final     % Basophils 11/11/2022 0  % Final     % Immature Granulocytes 11/11/2022 0  % Final     Absolute Neutrophils 11/11/2022 3.7  1.6 - 8.3 10e3/uL Final     Absolute Lymphocytes 11/11/2022 1.8  0.8 - 5.3 10e3/uL Final     Absolute  Monocytes 11/11/2022 0.4  0.0 - 1.3 10e3/uL Final     Absolute Eosinophils 11/11/2022 0.1  0.0 - 0.7 10e3/uL Final     Absolute Basophils 11/11/2022 0.0  0.0 - 0.2 10e3/uL Final     Absolute Immature Granulocytes 11/11/2022 0.0  <=0.4 10e3/uL Final     Quantiferon Nil Tube 11/11/2022 0.04  IU/mL Final     Quantiferon TB1 Tube 11/11/2022 0.12  IU/mL Final     Quantiferon TB2 Tube 11/11/2022 0.11   Final     Quantiferon Mitogen 11/11/2022 9.11  IU/mL Final     Quantiferon-TB Gold Plus 11/11/2022 Negative  Negative Final    No interferon gamma response to M.tuberculosis antigens was detected. Infection with M.tuberculosis is unlikely, however a single negative result does not exclude infection. In patients at high risk for infection, a second test should be considered in accordance with the 2017 ATS/IDSA/CDC Clinical Pract  ice Guidelines for Diagnosis of Tuberculosis in Adults and Children      TB1 Ag minus Nil Value 11/11/2022 0.08  IU/mL Final     TB2 Ag minus Nil Value 11/11/2022 0.07  IU/mL Final     Mitogen minus Nil Result 11/11/2022 9.07  IU/mL Final     Nil Result 11/11/2022 0.04  IU/mL Final   Orders Only on 11/08/2022   Component Date Value Ref Range Status     Neisseria gonorrhoeae 11/11/2022 Negative  Negative Final    Negative for N. gonorrhoeae rRNA by transcription mediated amplification. A negative result by transcription mediated amplification does not preclude the presence of C. trachomatis infection because results are dependent on proper and adequate collection, absence of inhibitors and sufficient rRNA to be detected.     Chlamydia trachomatis 11/11/2022 Negative  Negative Final    A negative result by transcription mediated amplification does not preclude the presence of C. trachomatis infection because results are dependent on proper and adequate collection, absence of inhibitors and sufficient rRNA to be detected.        ROS:  General: No fevers, sleeping well at night  Head: No headache  Neck:  "No swallowing problems   CV: No chest pain or palpitations  Resp: No shortness of breath.  No cough.  GI: No constipation, or diarrhea, no nausea or vomiting  : As above    Objective:  /78 (BP Location: Left arm, Patient Position: Sitting, Cuff Size: Adult Regular)   Pulse 76   Temp 98.6  F (37  C) (Oral)   Resp 16   Ht 1.619 m (5' 3.75\")   Wt 89.3 kg (196 lb 12.8 oz)   LMP 09/26/2022 (Exact Date)   SpO2 98%   BMI 34.05 kg/m    Gen:  Well nourished and in NAD  Psych: Euthymic      Latest Reference Range & Units 11/25/22 12:09   Color Urine Colorless, Straw, Light Yellow, Yellow  Yellow   Appearance Urine Clear  Clear   Glucose Urine Negative mg/dL Negative   Bilirubin Urine Negative  Negative   Ketones Urine Negative mg/dL Negative   Specific Gravity Urine 1.005 - 1.030  1.020   pH Urine 5.0 - 8.0  7.5   Protein Albumin Urine Negative mg/dL Negative   Urobilinogen Urine 0.2, 1.0 E.U./dL 0.2   Nitrite Urine Negative  Negative   Blood Urine Negative  Trace !   Leukocyte Esterase Urine Negative  Negative   WBC Urine 0-5 /HPF /HPF None Seen   RBC Urine 0-2 /HPF /HPF None Seen   Bacteria Urine None Seen /HPF Few !   Squamous Epithelial /LPF Urine None Seen /LPF Few !     Urine Culture  Order: [id number]   Collected 11/25/2022 12:09 PM      Status: Final result      Visible to patient: Yes (seen)      Dx: Dysuria     Specimen Information: Urine, Midstream    0 Result Notes     1 Patient Communication  Culture No Growth             Assessment/Plan:    1. Dysuria  UA and urine culture ordered today due to history of hospitalization in [facility] for pyelonephritis as a child.  She also had severe symptoms in August for which she received care in the ED.  Antibiotics are not indicated today.  Counseled on returning to clinic if symptoms worsen.  - UA reflex to Microscopic; Future  - Urine Culture; Future  - UA reflex to Microscopic  - Urine Culture  - Urine Microscopic Exam    2. Menstrual cramp  - naproxen " (NAPROSYN) 375 MG tablet; Take 1 tablet (375 mg) by mouth 2 times daily (with meals)  Dispense: 60 tablet; Refill: 1    3. Class 1 obesity due to excess calories without serious comorbidity with body mass index (BMI) of 34.0 to 34.9 in adult  History of restricting calories and exercising to lose weight.  Experience weight gain since moving to the [place].  A1c is normal.  Discussed Wellbutrin for assistance with cravings.  Discussed healthy diet and exercise.  Recommend establishing care with weight management clinic.  - Comprehensive Weight Management; Future  - buPROPion (WELLBUTRIN XL) 150 MG 24 hr tablet; Take 1 tablet (150 mg) by mouth every morning  Dispense: 60 tablet; Refill: 0      I discussed annual checkups for preventive care, routine health vaccinations.  Appropriate vaccinations were given: Hepatitis B, TdaP      Staffed with Dr. Menchaca.    Karina Potter MD

## 2022-12-06 NOTE — PROGRESS NOTES
Preceptor Attestation:    I discussed the patient with the resident and evaluated the patient in person. I have verified the content of the note, which accurately reflects my assessment of the patient and the plan of care.   Supervising Physician:  Judd Menchaca MD.

## 2023-02-12 ENCOUNTER — HEALTH MAINTENANCE LETTER (OUTPATIENT)
Age: 23
End: 2023-02-12

## 2023-08-09 ENCOUNTER — HOSPITAL ENCOUNTER (EMERGENCY)
Facility: CLINIC | Age: 23
Discharge: HOME | End: 2023-08-09
Attending: PHYSICIAN ASSISTANT | Admitting: PHYSICIAN ASSISTANT
Payer: COMMERCIAL

## 2023-08-09 VITALS
HEART RATE: 92 BPM | WEIGHT: 189.6 LBS | RESPIRATION RATE: 14 BRPM | SYSTOLIC BLOOD PRESSURE: 122 MMHG | DIASTOLIC BLOOD PRESSURE: 82 MMHG | TEMPERATURE: 98.8 F | BODY MASS INDEX: 32.8 KG/M2 | OXYGEN SATURATION: 97 %

## 2023-08-09 DIAGNOSIS — U07.1 COVID-19: ICD-10-CM

## 2023-08-09 PROCEDURE — 99282 EMERGENCY DEPT VISIT SF MDM: CPT

## 2023-08-09 ASSESSMENT — ACTIVITIES OF DAILY LIVING (ADL): ADLS_ACUITY_SCORE: 33

## 2023-08-09 NOTE — ED PROVIDER NOTES
History     Chief Complaint:  Covid Concern       The history is provided by the patient. No  was used.      Shabnam Clemens is a 23 year old female who presents with concern for COVID-19. The patient reports that 6 days ago she began experiencing a fever up to 102.2  F. She has also experienced rhinorrhea, fatigue, cough, and throat soreness. She denies experiencing changes to her sense of smell or any otalgia. She took a COVID-19 test at home and this was positive. She denies having any medical problems or taking any medications.    Independent Historian:   None - Patient Only    Review of External Notes:   None    Medications:    Bupropion    Past Medical History:    The patient does not have any past pertinent medical history.    Physical Exam   Patient Vitals for the past 24 hrs:   BP Temp Temp src Pulse Resp SpO2 Weight   08/09/23 1246 122/82 -- -- -- -- -- --   08/09/23 1243 -- 98.8  F (37.1  C) Temporal 92 14 97 % 86 kg (189 lb 9.5 oz)        Physical Exam  Constitutional: Pleasant. Cooperative.  Eyes: Pupils equally round  HENT: Head is normal in appearance.   Cardiovascular: Regular rate and rhythm without murmurs.  Respiratory: Normal respiratory effort, lungs clear to auscultation  Musculoskeletal: No asymmetry  Skin: Normal, without rash.  Neurologic: Cranial nerves grossly intact, normal cognition, no apparent deficits.  Psychiatric: Normal affect.  Nursing notes and vital signs reviewed.    Emergency Department Course     Laboratory:  Labs Ordered and Resulted from Time of ED Arrival to Time of ED Departure - No data to display     Emergency Department Course & Assessments:    Interventions:  Medications - No data to display     Assessments:  1648 I obtained history and examined the patient.    Independent Interpretation (X-rays, CTs, rhythm strip):  None    Consultations/Discussion of Management or Tests:  None     Social Determinants of Health affecting care:    None    Disposition:  The patient was discharged to home.     Impression & Plan      Medical Decision Making:  Shabnam Clemens is a 23 year old female who presents to the ED for evaluation of fever, rhionrrhea, cough. See HPI as above for additional details. Vitals and physical exam as above. Patient tested positive for COVID and would like lungs evaluated. Lungs CTA. Vitals reassuring. Discussed conservatives cares as well as quarantine guidelines. No indication for admission at this time. Patient not good candidate for Paxlovid. Discussed reasons to return. All questions answered. Patient discharged to home in stable condition.    Diagnosis:    ICD-10-CM    1. COVID-19  U07.1            Discharge Medications:  New Prescriptions    No medications on file        Scribe Disclosure:  I, Wes Andrews, am serving as a scribe at 4:46 PM on 8/9/2023 to document services personally performed by Jona Hargrove PA-C based on my observations and the provider's statements to me.     This record was created at least in part using electronic voice recognition software, so please excuse any typographical errors.       Jona Hargrove PA-C  08/09/23 3821

## 2023-08-09 NOTE — DISCHARGE INSTRUCTIONS

## 2023-08-09 NOTE — ED TRIAGE NOTES
"Since Friday: cold and hot sweats, fatigue and cough. Covid test positive yesterday. Pt requesting MD to \"Check my lungs.\" Positive covid test Tuesday. ABC in tact. A/OX4        "

## 2024-03-10 ENCOUNTER — HEALTH MAINTENANCE LETTER (OUTPATIENT)
Age: 24
End: 2024-03-10

## 2025-03-16 ENCOUNTER — HEALTH MAINTENANCE LETTER (OUTPATIENT)
Age: 25
End: 2025-03-16